# Patient Record
Sex: FEMALE | Race: OTHER | NOT HISPANIC OR LATINO | URBAN - METROPOLITAN AREA
[De-identification: names, ages, dates, MRNs, and addresses within clinical notes are randomized per-mention and may not be internally consistent; named-entity substitution may affect disease eponyms.]

---

## 2023-11-10 PROBLEM — Z00.00 ENCOUNTER FOR PREVENTIVE HEALTH EXAMINATION: Status: ACTIVE | Noted: 2023-11-10

## 2023-11-13 PROBLEM — O36.80X0 PREGNANCY OF UNKNOWN ANATOMIC LOCATION: Status: ACTIVE | Noted: 2023-11-10

## 2023-11-30 PROBLEM — Z32.00 ENCOUNTER FOR CONFIRMATION OF PREGNANCY TEST RESULT WITH PHYSICAL EXAMINATION: Status: ACTIVE | Noted: 2023-11-30

## 2024-01-04 PROBLEM — Z34.91 ENCOUNTER FOR SUPERVISION OF LOW-RISK PREGNANCY IN FIRST TRIMESTER: Status: ACTIVE | Noted: 2024-01-04

## 2024-04-11 PROBLEM — Z34.82 ENCOUNTER FOR SUPERVISION OF NORMAL PREGNANCY IN MULTIGRAVIDA IN SECOND TRIMESTER: Status: ACTIVE | Noted: 2024-02-01

## 2024-07-08 PROBLEM — E03.9 HYPOTHYROID: Status: ACTIVE | Noted: 2024-07-08

## 2024-07-15 ENCOUNTER — INPATIENT (INPATIENT)
Facility: HOSPITAL | Age: 29
LOS: 3 days | Discharge: HOME | End: 2024-07-19
Attending: STUDENT IN AN ORGANIZED HEALTH CARE EDUCATION/TRAINING PROGRAM | Admitting: STUDENT IN AN ORGANIZED HEALTH CARE EDUCATION/TRAINING PROGRAM
Payer: COMMERCIAL

## 2024-07-15 VITALS
SYSTOLIC BLOOD PRESSURE: 115 MMHG | RESPIRATION RATE: 16 BRPM | TEMPERATURE: 99 F | DIASTOLIC BLOOD PRESSURE: 72 MMHG | HEART RATE: 86 BPM

## 2024-07-15 DIAGNOSIS — O26.899 OTHER SPECIFIED PREGNANCY RELATED CONDITIONS, UNSPECIFIED TRIMESTER: ICD-10-CM

## 2024-07-15 DIAGNOSIS — Z98.891 HISTORY OF UTERINE SCAR FROM PREVIOUS SURGERY: Chronic | ICD-10-CM

## 2024-07-15 LAB — AMNISURE ROM (RUPTURE OF MEMBRANES): POSITIVE

## 2024-07-15 RX ORDER — OXYTOCIN 30 [USP'U]/500ML
333.33 INJECTION, SOLUTION INTRAVENOUS
Qty: 20 | Refills: 0 | Status: COMPLETED | OUTPATIENT
Start: 2024-07-15 | End: 2024-07-16

## 2024-07-15 RX ORDER — DEXTROSE MONOHYDRATE AND SODIUM CHLORIDE 5; .3 G/100ML; G/100ML
1000 INJECTION, SOLUTION INTRAVENOUS
Refills: 0 | Status: DISCONTINUED | OUTPATIENT
Start: 2024-07-15 | End: 2024-07-17

## 2024-07-15 RX ORDER — OXYTOCIN 30 [USP'U]/500ML
INJECTION, SOLUTION INTRAVENOUS
Qty: 30 | Refills: 0 | Status: DISCONTINUED | OUTPATIENT
Start: 2024-07-15 | End: 2024-07-16

## 2024-07-15 NOTE — OB PROVIDER H&P - NSHPPHYSICALEXAM_GEN_ALL_CORE
T(C): 37.1 (07-15-24 @ 22:10), Max: 37.1 (07-15-24 @ 20:17)  HR: 92 (07-15-24 @ 22:13) (86 - 92)  BP: 121/70 (07-15-24 @ 22:13) (115/72 - 121/70)  RR: 16 (07-15-24 @ 22:10) (16 - 16)    Heart: RRR  Lungs: CTA  Abdomen: Gravid, soft, NT    NST: Reactive with moderate variability  [place]: Irregular contractions  SSE: no pooling, nitrazine neg, fern neg, Amnisure positive  VE: 2/50/-3  TAS: Cephalic presentation

## 2024-07-15 NOTE — OB PROVIDER H&P - ASSESSMENT
28y  at 39w2d with PROM  Pt with h/o prior C/s for TOLAC  - H/o hypothyroidism  GBS negative  D/w Dr Gant  -Admit to labor and delivery for induction of labor with Cervical balloon and pitocin  -Pain Management prn  -Cont EFM/[place]  -Admission labs: CBC, RPR, T&S  -Type & Crossmatch x 2 units  -IV hydration  -Clear liquid diet  -CRB and Pitocin and for IOL

## 2024-07-15 NOTE — OB PROVIDER H&P - NSLOWPPHRISK_OBGYN_A_OB
Du Pregnancy/Less than or equal to 4 previous vaginal births/No known bleeding disorder/No history of postpartum hemorrhage/No other PPH risks indicated

## 2024-07-15 NOTE — OB PROVIDER H&P - PROBLEM SELECTOR PLAN 1
D/w Dr Gant  -Admit to labor and delivery for induction of labor with Cervical balloon and pitocin  -Pain Management prn  -Cont EFM/[place]  -Admission labs: CBC, RPR, T&S  -Type & Crossmatch x 2 units  -IV hydration  -Clear liquid diet  -CRB and Pitocin and for IOL

## 2024-07-15 NOTE — OB PROVIDER H&P - HISTORY OF PRESENT ILLNESS
28y  at 39w2d presents to triage c/o LOF since 10am this morning, intermittently. Pt reports feeling mild contractions  Reports +FM, no vaginal bleeding.  Prenatal care: Dr Merino  GBS: Negative 24  Prenatal complications:  - h/o prior C/section, desires TOLAC  - Hypothyroidism on synthroid 100mcg

## 2024-07-16 DIAGNOSIS — O42.90 PREMATURE RUPTURE OF MEMBRANES, UNSPECIFIED AS TO LENGTH OF TIME BETWEEN RUPTURE AND ONSET OF LABOR, UNSPECIFIED WEEKS OF GESTATION: ICD-10-CM

## 2024-07-16 LAB
BASOPHILS # BLD AUTO: 0.03 K/UL — SIGNIFICANT CHANGE UP (ref 0–0.2)
BASOPHILS NFR BLD AUTO: 0.4 % — SIGNIFICANT CHANGE UP (ref 0–2)
BLD GP AB SCN SERPL QL: NEGATIVE — SIGNIFICANT CHANGE UP
EOSINOPHIL # BLD AUTO: 0.12 K/UL — SIGNIFICANT CHANGE UP (ref 0–0.5)
EOSINOPHIL NFR BLD AUTO: 1.7 % — SIGNIFICANT CHANGE UP (ref 0–6)
HCT VFR BLD CALC: 31.9 % — LOW (ref 34.5–45)
HGB BLD-MCNC: 11.2 G/DL — LOW (ref 11.5–15.5)
IANC: 4.63 K/UL — SIGNIFICANT CHANGE UP (ref 1.8–7.4)
IMM GRANULOCYTES NFR BLD AUTO: 0.4 % — SIGNIFICANT CHANGE UP (ref 0–0.9)
LYMPHOCYTES # BLD AUTO: 1.78 K/UL — SIGNIFICANT CHANGE UP (ref 1–3.3)
LYMPHOCYTES # BLD AUTO: 25.1 % — SIGNIFICANT CHANGE UP (ref 13–44)
MCHC RBC-ENTMCNC: 29.9 PG — SIGNIFICANT CHANGE UP (ref 27–34)
MCHC RBC-ENTMCNC: 35.1 GM/DL — SIGNIFICANT CHANGE UP (ref 32–36)
MCV RBC AUTO: 85.1 FL — SIGNIFICANT CHANGE UP (ref 80–100)
MONOCYTES # BLD AUTO: 0.5 K/UL — SIGNIFICANT CHANGE UP (ref 0–0.9)
MONOCYTES NFR BLD AUTO: 7.1 % — SIGNIFICANT CHANGE UP (ref 2–14)
NEUTROPHILS # BLD AUTO: 4.63 K/UL — SIGNIFICANT CHANGE UP (ref 1.8–7.4)
NEUTROPHILS NFR BLD AUTO: 65.3 % — SIGNIFICANT CHANGE UP (ref 43–77)
NRBC # BLD: 0 /100 WBCS — SIGNIFICANT CHANGE UP (ref 0–0)
NRBC # FLD: 0 K/UL — SIGNIFICANT CHANGE UP (ref 0–0)
PLATELET # BLD AUTO: 234 K/UL — SIGNIFICANT CHANGE UP (ref 150–400)
RBC # BLD: 3.75 M/UL — LOW (ref 3.8–5.2)
RBC # FLD: 13.8 % — SIGNIFICANT CHANGE UP (ref 10.3–14.5)
RH IG SCN BLD-IMP: POSITIVE — SIGNIFICANT CHANGE UP
RH IG SCN BLD-IMP: POSITIVE — SIGNIFICANT CHANGE UP
T PALLIDUM AB TITR SER: NEGATIVE — SIGNIFICANT CHANGE UP
WBC # BLD: 7.09 K/UL — SIGNIFICANT CHANGE UP (ref 3.8–10.5)
WBC # FLD AUTO: 7.09 K/UL — SIGNIFICANT CHANGE UP (ref 3.8–10.5)

## 2024-07-16 PROCEDURE — 59515 CESAREAN DELIVERY: CPT

## 2024-07-16 PROCEDURE — 49000 EXPLORATION OF ABDOMEN: CPT

## 2024-07-16 DEVICE — SURGICEL SNOW 2 X 4": Type: IMPLANTABLE DEVICE | Status: FUNCTIONAL

## 2024-07-16 DEVICE — INTERCEED 3 X 4": Type: IMPLANTABLE DEVICE | Status: FUNCTIONAL

## 2024-07-16 DEVICE — SURGICEL NU-KNIT 6 X 9": Type: IMPLANTABLE DEVICE | Status: FUNCTIONAL

## 2024-07-16 RX ORDER — HEPARIN SODIUM 50 [USP'U]/ML
5000 INJECTION, SOLUTION INTRAVENOUS EVERY 12 HOURS
Refills: 0 | Status: DISCONTINUED | OUTPATIENT
Start: 2024-07-16 | End: 2024-07-19

## 2024-07-16 RX ORDER — OXYCODONE HYDROCHLORIDE 100 MG/5ML
5 SOLUTION ORAL ONCE
Refills: 0 | Status: DISCONTINUED | OUTPATIENT
Start: 2024-07-16 | End: 2024-07-19

## 2024-07-16 RX ORDER — LEVOTHYROXINE SODIUM 25 MCG
100 TABLET ORAL
Refills: 0 | Status: DISCONTINUED | OUTPATIENT
Start: 2024-07-16 | End: 2024-07-19

## 2024-07-16 RX ORDER — KETOROLAC TROMETHAMINE 30 MG/ML
30 INJECTION, SOLUTION INTRAMUSCULAR EVERY 6 HOURS
Refills: 0 | Status: DISCONTINUED | OUTPATIENT
Start: 2024-07-16 | End: 2024-07-17

## 2024-07-16 RX ORDER — SIMETHICONE 40MG/0.6ML
80 SUSPENSION, DROPS(FINAL DOSAGE FORM)(ML) ORAL EVERY 4 HOURS
Refills: 0 | Status: DISCONTINUED | OUTPATIENT
Start: 2024-07-16 | End: 2024-07-19

## 2024-07-16 RX ORDER — TETANUS TOXOID, REDUCED DIPHTHERIA TOXOID AND ACELLULAR PERTUSSIS VACCINE, ADSORBED 5; 2.5; 8; 8; 2.5 [IU]/.5ML; [IU]/.5ML; UG/.5ML; UG/.5ML; UG/.5ML
0.5 SUSPENSION INTRAMUSCULAR ONCE
Refills: 0 | Status: DISCONTINUED | OUTPATIENT
Start: 2024-07-16 | End: 2024-07-19

## 2024-07-16 RX ORDER — DIPHENHYDRAMINE HCL 12.5MG/5ML
25 ELIXIR ORAL EVERY 6 HOURS
Refills: 0 | Status: DISCONTINUED | OUTPATIENT
Start: 2024-07-16 | End: 2024-07-19

## 2024-07-16 RX ORDER — DEXTROSE MONOHYDRATE AND SODIUM CHLORIDE 5; .3 G/100ML; G/100ML
1000 INJECTION, SOLUTION INTRAVENOUS
Refills: 0 | Status: DISCONTINUED | OUTPATIENT
Start: 2024-07-16 | End: 2024-07-19

## 2024-07-16 RX ORDER — DEXAMETHASONE 1 MG/1
4 TABLET ORAL EVERY 6 HOURS
Refills: 0 | Status: DISCONTINUED | OUTPATIENT
Start: 2024-07-16 | End: 2024-07-17

## 2024-07-16 RX ORDER — ACETAMINOPHEN 325 MG
975 TABLET ORAL
Refills: 0 | Status: DISCONTINUED | OUTPATIENT
Start: 2024-07-16 | End: 2024-07-19

## 2024-07-16 RX ORDER — LEVOTHYROXINE SODIUM 25 MCG
50 TABLET ORAL
Refills: 0 | Status: DISCONTINUED | OUTPATIENT
Start: 2024-07-16 | End: 2024-07-19

## 2024-07-16 RX ORDER — OXYTOCIN 30 [USP'U]/500ML
1 INJECTION, SOLUTION INTRAVENOUS
Qty: 30 | Refills: 0 | Status: DISCONTINUED | OUTPATIENT
Start: 2024-07-16 | End: 2024-07-17

## 2024-07-16 RX ORDER — ONDANSETRON HYDROCHLORIDE 2 MG/ML
4 INJECTION INTRAMUSCULAR; INTRAVENOUS EVERY 6 HOURS
Refills: 0 | Status: DISCONTINUED | OUTPATIENT
Start: 2024-07-16 | End: 2024-07-17

## 2024-07-16 RX ORDER — OXYTOCIN 30 [USP'U]/500ML
333.33 INJECTION, SOLUTION INTRAVENOUS
Qty: 20 | Refills: 0 | Status: DISCONTINUED | OUTPATIENT
Start: 2024-07-16 | End: 2024-07-17

## 2024-07-16 RX ORDER — OXYCODONE HYDROCHLORIDE 100 MG/5ML
5 SOLUTION ORAL
Refills: 0 | Status: COMPLETED | OUTPATIENT
Start: 2024-07-16 | End: 2024-07-23

## 2024-07-16 RX ORDER — LANOLIN
1 WAX (GRAM) MISCELLANEOUS EVERY 6 HOURS
Refills: 0 | Status: DISCONTINUED | OUTPATIENT
Start: 2024-07-16 | End: 2024-07-19

## 2024-07-16 RX ORDER — DEXTROSE MONOHYDRATE AND SODIUM CHLORIDE 5; .3 G/100ML; G/100ML
500 INJECTION, SOLUTION INTRAVENOUS ONCE
Refills: 0 | Status: COMPLETED | OUTPATIENT
Start: 2024-07-16 | End: 2024-07-16

## 2024-07-16 RX ORDER — NALBUPHINE HCL 100 %
2.5 POWDER (GRAM) MISCELLANEOUS EVERY 6 HOURS
Refills: 0 | Status: DISCONTINUED | OUTPATIENT
Start: 2024-07-16 | End: 2024-07-17

## 2024-07-16 RX ORDER — NALOXONE HYDROCHLORIDE 1 MG/ML
0.1 INJECTION PARENTERAL
Refills: 0 | Status: DISCONTINUED | OUTPATIENT
Start: 2024-07-16 | End: 2024-07-17

## 2024-07-16 RX ORDER — OXYCODONE HYDROCHLORIDE 100 MG/5ML
5 SOLUTION ORAL
Refills: 0 | Status: DISCONTINUED | OUTPATIENT
Start: 2024-07-16 | End: 2024-07-16

## 2024-07-16 RX ORDER — MORPHINE SULFATE 100 MG/1
2 TABLET, EXTENDED RELEASE ORAL ONCE
Refills: 0 | Status: DISCONTINUED | OUTPATIENT
Start: 2024-07-16 | End: 2024-07-17

## 2024-07-16 RX ADMIN — KETOROLAC TROMETHAMINE 30 MILLIGRAM(S): 30 INJECTION, SOLUTION INTRAMUSCULAR at 23:00

## 2024-07-16 RX ADMIN — Medication 50 MICROGRAM(S): at 07:00

## 2024-07-16 RX ADMIN — KETOROLAC TROMETHAMINE 30 MILLIGRAM(S): 30 INJECTION, SOLUTION INTRAMUSCULAR at 22:06

## 2024-07-16 RX ADMIN — OXYTOCIN 2 MILLIUNIT(S)/MIN: 30 INJECTION, SOLUTION INTRAVENOUS at 01:56

## 2024-07-16 RX ADMIN — DEXTROSE MONOHYDRATE AND SODIUM CHLORIDE 1000 MILLILITER(S): 5; .3 INJECTION, SOLUTION INTRAVENOUS at 02:15

## 2024-07-16 RX ADMIN — DEXTROSE MONOHYDRATE AND SODIUM CHLORIDE 500 MILLILITER(S): 5; .3 INJECTION, SOLUTION INTRAVENOUS at 06:00

## 2024-07-16 RX ADMIN — Medication 1 APPLICATION(S): at 03:36

## 2024-07-16 RX ADMIN — DEXTROSE MONOHYDRATE AND SODIUM CHLORIDE 125 MILLILITER(S): 5; .3 INJECTION, SOLUTION INTRAVENOUS at 00:57

## 2024-07-16 RX ADMIN — OXYTOCIN 1 MILLIUNIT(S)/MIN: 30 INJECTION, SOLUTION INTRAVENOUS at 06:49

## 2024-07-16 RX ADMIN — OXYTOCIN 1000 MILLIUNIT(S)/MIN: 30 INJECTION, SOLUTION INTRAVENOUS at 22:06

## 2024-07-16 NOTE — OB PROVIDER LABOR PROGRESS NOTE - NS_SUBJECTIVE/OBJECTIVE_OBGYN_ALL_OB_FT
Pt seen and examined for prolonged deceleration
Pt seen and examined at bedside for insertion of Cervical Balloon.

## 2024-07-16 NOTE — CHART NOTE - NSCHARTNOTEFT_GEN_A_CORE
Called for intraop consult to evaluate bladder. Bladder filled and dissected from peritoneum, mobilized away from uterus. No extravasation of methylene blue observed. Osborn upsized to 20 Fr with hematuria.     Continue osborn 24 hrs   Continue to monitor urine color   urology will follow
Patient uncomfortable  FHT category 1  VE 6/70/-3  AROM- forewaters clear
Patient felt some pressure  FHT category 1  VE 7/80/-1  cont present management
Patient had 2 minute decel in LDR 3. ON VE- cervix unchanged but had sign vaginal bleeding and felt station went from -1 to _3. Called for urgent c/section. FHR was back up went took her back as well as in OR, so did c/section under spinal

## 2024-07-16 NOTE — OB RN DELIVERY SUMMARY - NS_SEPSISRSKCALC_OBGYN_ALL_OB_FT
EOS calculated successfully. EOS Risk Factor: 0.5/1000 live births ([facility] national incidence); GA=39w2d; Temp=98.8; ROM=30.25; GBS='Negative'; Antibiotics='No antibiotics or any antibiotics < 2 hrs prior to birth'

## 2024-07-16 NOTE — OB RN DELIVERY SUMMARY - NSSELHIDDEN_OBGYN_ALL_OB_FT
[NS_DeliveryAttending1_OBGYN_ALL_OB_FT:[id number]],[NS_DeliveryRN_OBGYN_ALL_OB_FT:[id number]=]

## 2024-07-16 NOTE — OB RN INTRAOPERATIVE NOTE - NSSELHIDDEN_OBGYN_ALL_OB_FT
[NS_DeliveryAttending1_OBGYN_ALL_OB_FT:[id number]],[NS_DeliveryRN_OBGYN_ALL_OB_FT:[id number]] [NS_DeliveryAttending1_OBGYN_ALL_OB_FT:[id number]],[NS_DeliveryRN_OBGYN_ALL_OB_FT:[id number]],[NS_CirculateRN2_OBGYN_ALL_OB_FT:[id number]]

## 2024-07-16 NOTE — OB PROVIDER TRIAGE NOTE - NSHPPHYSICALEXAM_GEN_ALL_CORE
T(C): 37.1 (07-15-24 @ 22:10), Max: 37.1 (07-15-24 @ 20:17)  HR: 92 (07-15-24 @ 22:13) (86 - 92)  BP: 121/70 (07-15-24 @ 22:13) (115/72 - 121/70)  RR: 16 (07-15-24 @ 22:10) (16 - 16)    Heart: RRR  Lungs: CTA  Abdomen: Gravid, soft, NT    NST: Reactive with moderate variability  [place]: Irregular contractions  SSE: no pooling, nitrazine neg, fern neg, Amnisure positive  VE: 2/50/-3  TAS: Cephalic presentation

## 2024-07-16 NOTE — PROGRESS NOTE ADULT - SUBJECTIVE AND OBJECTIVE BOX
Note    Post op Check    s/p : Intraop consult bladder filled and dissected from peritoneum. Wilkinson upsized to 20 Fr.    Pt seen / examined without complaints pain controlled    Vital Signs Last 24 Hrs  T(C): 36.9 (16 Jul 2024 15:00), Max: 37.0 (16 Jul 2024 10:57)  T(F): 98.42 (16 Jul 2024 15:00), Max: 98.6 (16 Jul 2024 10:57)  HR: 85 (16 Jul 2024 21:45) (73 - 105)  BP: 106/65 (16 Jul 2024 21:45) (91/53 - 147/90)  BP(mean): 74 (16 Jul 2024 21:45) (66 - 78)  RR: 17 (16 Jul 2024 21:45) (12 - 18)  SpO2: 95% (16 Jul 2024 21:45) (93% - 98%)    Parameters below as of 16 Jul 2024 00:58  Patient On (Oxygen Delivery Method): room air    I&O's Summary    15 Jul 2024 07:01  -  16 Jul 2024 07:00  --------------------------------------------------------  IN: 1375 mL / OUT: 0 mL / NET: 1375 mL    16 Jul 2024 07:01  -  16 Jul 2024 22:20  --------------------------------------------------------  IN: 5475 mL / OUT: 2837 mL / NET: 2638 mL    Vital signs reviewed.   CONSTITUTIONAL: Well-appearing; well-nourished; in no apparent distress. Non-toxic appearing.   HEAD: Normocephalic, atraumatic.  EYES: Normalconjunctiva and no sclera injection noted  ENT: Normal nose; no rhinorrhea.  CARD: Normal S1, S2  RESP: Normal chest excursion with respiration; breath sounds clear and equal bilaterally.  ABD/GI: Surgical sites CDI. Soft, non-distended; non-tender.  EXT/MS: Moves all extremities; distal pulses are normal, no pedal edema.  SKIN: Normal for age and race; warm; dry; good turgor; no apparent lesions or exudate noted.  NEURO: Awake, alert, oriented x 3.  PSYCH: Normal mood; appropriate affect.    Wilkinson catheter 200 CC noah colored urine                            11.2   7.09  )-----------( 234      ( 16 Jul 2024 00:44 )             31.9   Note    Post op Check    s/p : Intraop consult bladder filled and dissected from peritoneum. Wilkinson upsized to 22 Fr.    Pt seen / examined without complaints pain controlled    Vital Signs Last 24 Hrs  T(C): 36.9 (16 Jul 2024 15:00), Max: 37.0 (16 Jul 2024 10:57)  T(F): 98.42 (16 Jul 2024 15:00), Max: 98.6 (16 Jul 2024 10:57)  HR: 85 (16 Jul 2024 21:45) (73 - 105)  BP: 106/65 (16 Jul 2024 21:45) (91/53 - 147/90)  BP(mean): 74 (16 Jul 2024 21:45) (66 - 78)  RR: 17 (16 Jul 2024 21:45) (12 - 18)  SpO2: 95% (16 Jul 2024 21:45) (93% - 98%)    Parameters below as of 16 Jul 2024 00:58  Patient On (Oxygen Delivery Method): room air    I&O's Summary    15 Jul 2024 07:01  -  16 Jul 2024 07:00  --------------------------------------------------------  IN: 1375 mL / OUT: 0 mL / NET: 1375 mL    16 Jul 2024 07:01  -  16 Jul 2024 22:20  --------------------------------------------------------  IN: 5475 mL / OUT: 2837 mL / NET: 2638 mL    Vital signs reviewed.   CONSTITUTIONAL: Well-appearing; well-nourished; in no apparent distress. Non-toxic appearing.   HEAD: Normocephalic, atraumatic.  EYES: Normalconjunctiva and no sclera injection noted  ENT: Normal nose; no rhinorrhea.  CARD: Normal S1, S2  RESP: Normal chest excursion with respiration; breath sounds clear and equal bilaterally.  ABD/GI: Surgical sites CDI. Soft, non-distended; non-tender.  EXT/MS: Moves all extremities; distal pulses are normal, no pedal edema.  SKIN: Normal for age and race; warm; dry; good turgor; no apparent lesions or exudate noted.  NEURO: Awake, alert, oriented x 3.  PSYCH: Normal mood; appropriate affect.    Wilkinson catheter 200 CC noah colored urine                            11.2   7.09  )-----------( 234      ( 16 Jul 2024 00:44 )             31.9

## 2024-07-16 NOTE — PROGRESS NOTE ADULT - ASSESSMENT
PT is s/p Intraop consult bladder filled and dissected from peritoneum. Wilkinson upsized to 20 Fr. No acute events during PACU course.     Strict I&O's  Analgesia/Antiemetics  DVT prophylaxis  Incentive spirometry  Clears / OOB     PT is s/p Intraop consult bladder filled and dissected from peritoneum. Wilkinson upsized to 22 Fr. No acute events during PACU course.     Strict I&O's  Analgesia/Antiemetics  DVT prophylaxis  Incentive spirometry  Clears / OOB

## 2024-07-16 NOTE — OB PROVIDER DELIVERY SUMMARY - NSSELHIDDEN_OBGYN_ALL_OB_FT
[NS_DeliveryAttending1_OBGYN_ALL_OB_FT:[id number]],[NS_DeliveryRN_OBGYN_ALL_OB_FT:[id number]],[NS_DeliveryAssist1_OBGYN_ALL_OB_FT:[id number]=]

## 2024-07-16 NOTE — OB PROVIDER LABOR PROGRESS NOTE - ASSESSMENT
Patient seen and examined secondary to*****. *****Effective epidural in situ.    T(C): 37.1 (07-15-24 @ 22:10), Max: 37.1 (07-15-24 @ 20:17)  HR: 88 (24 @ 00:58) (86 - 92)  BP: 120/65 (24 @ 00:58) (115/72 - 121/70)  RR: 17 (24 @ 00:58) (16 - 17)  SpO2: --    28y  @01bhq5p here for TOLAC, IOL for PROM this AM.    -s/p cervical balloon at 1:30am  -3/50/-3  -pt with PROM  -For pitocin when able  -Cont fetal/maternal monitoring  -Will reassess PRN    Will discuss with Dr. Stalin Pryor, PGY-1

## 2024-07-16 NOTE — OB PROVIDER DELIVERY SUMMARY - NSPROVIDERDELIVERYNOTE_OBGYN_ALL_OB_FT
emergent rLTCS@39w2d for concern for uterine rupture in setting of new onset bright red vaginal bleeding, fetal heart deceleration and small loss of station since prior exam.     On entry into peritoneal cavity, no uterine rupture noted. Bladder noted to be higher on the lower uterine segment.  Low transverse incision made.  Viable female infant,  vertex OP presentation.     Upon inspection of the hysterotomy, bladder noted to be  in close proximity to the inferior portion of the hysterotomy. Bladder taken down sharply. Upon taking down the bladder, large 7cm x 6cm uterine defect noted posterior to the bladder. Hematuria noted at this time.   Hysterotomy closed in a single layer with 1-0 caprosyn.      Urology consulted intraoperatively for bladder dissection and mobilization, please see separate brief op not for details. Bladder backfilled with methylene blue, noted to be intact with no extravasation of methylene blue.  After dissection of peritoneum off the bladder by Urology, Surgicell placed between peritoneum fold and bladder.  No indication for cystoscopy per Urology team.      Lower uterine segment defect (likely site of previous hysterotomy) was reapproximated with 2-0 caprosyn.  Surgicell powder and Intercede placed along hysterotomy.   Fascia with 1-0 vicryl. Subcutaneous fat closed with 2-0 plain gut. Skin closed with 4-0 monocryl. Good hemostasis noted.  Grossly normal fallopian tubes, ovaries.  Aquacel dressing placed.     QBL:  952  IVF: 4000  UOP: 700      CBC/BMP in 4 hours  CBC/BMP in AM  20 Spanish osborn to remain until hematuria resolving, Urology following.     Dictation#

## 2024-07-16 NOTE — OB RN PATIENT PROFILE - FALL HARM RISK - UNIVERSAL INTERVENTIONS
Bed in lowest position, wheels locked, appropriate side rails in place/Call bell, personal items and telephone in reach/Instruct patient to call for assistance before getting out of bed or chair/Non-slip footwear when patient is out of bed/[place] to call system/Physically safe environment - no spills, clutter or unnecessary equipment/Purposeful Proactive Rounding/Room/bathroom lighting operational, light cord in reach

## 2024-07-16 NOTE — OB PROVIDER LABOR PROGRESS NOTE - ASSESSMENT
28y  @39w2d admitted for IOL.    - Maternal repositioning and tracing resuscitated  - 500cc bolus administered  - Continue cont EFM, toco, IVF    Dr. Medina in room during evaluation  Jennifer Aldana PGY2

## 2024-07-16 NOTE — OB NEONATOLOGY/PEDIATRICIAN DELIVERY SUMMARY - NSPEDSNEONOTESA_OBGYN_ALL_OB_FT
NICU resuscitation team called to OR for baby girl born at 39.1 wks via unscheduled CS due to concern for uterine rupture to a 27 y/o  A+ blood type mother. Maternal history of hypothyroidism, on levothyroxine 50mcg Tue/Thur/Sat/Sun and 100mcg on Mon/Wed/Fri. Prenatal history of one prior CS for failure of progression of labor. PNL: HIV NR, Hep B neg, Rubella immune, RPR neg, Hep C NR. GBS - on . SROM at 1000 on 7/15 with clear fluids. (ROM: ~ 28 hrs) Delivery complicated by concern for uterine rupture.   Delayed cord clamping x60 sec. Baby emerged vigorous, crying, was w/d/s/s with APGARS of 8/9. Mom would like to breastfeed, and consents to the Hep B vaccine. Highest maternal temp. was 37.0 C, EOS 0.21    Physical Exam:  Gen: NAD, +grimace  HEENT: anterior fontanel open soft and flat, no cleft lip/palate, ears normal set, no ear pits or tags. no lesions in mouth/throat, nares clinically patent  Resp: no increased work of breathing, good air entry b/l, clear to auscultation bilaterally  Cardio: Normal S1/S2, regular rate and rhythm, no murmurs, rubs or gallops  Abd: soft, non tender, non distended, + bowel sounds, umbilical cord with 3 vessels  Neuro: +grasp/suck/fabian, normal tone  Extremities: negative yo and ortolani, moving all extremities, full range of motion x 4, no crepitus  Skin: pink, warm  Genitals: Normal female anatomy, Reji 1, anus patent NICU resuscitation team called to OR for baby girl born at 39.1 wks via unscheduled CS due to suspected uterine rupture to a 27 y/o  A+ blood type mother. Maternal history of hypothyroidism, on levothyroxine 50mcg Tue/Thur/Sat/Sun and 100mcg on Mon/Wed/Fri. Prenatal history of one prior CS for failure of progression of labor. PNL: HIV NR, Hep B neg, Rubella immune, RPR neg, Hep C NR. GBS negative on . SROM at 1000 on 7/15 with clear fluids. (ROM: ~ 28 hrs) Delivery complicated by suspected uterine rupture.   Baby emerged vigorous, crying. Delayed cord clamping x 60 seconds. WDSS APGAR 8/9. Mother would like to breastfeed, and consents to the HBV vaccine. Highest maternal temp. was 37.0 C, EOS 0.21    Physical Exam:  Gen: NAD, +grimace  HEENT: anterior fontanel open soft and flat, no cleft lip/palate, ears normal set, no ear pits or tags.  Resp: no increased work of breathing, good air entry b/l, clear to auscultation bilaterally  Cardio: Normal S1/S2, regular rate and rhythm, no murmurs, rubs or gallops  Abd: soft, non tender, non distended, + bowel sounds, umbilical cord with 3 vessels  Neuro: +grasp/suck/M[place], normal tone  Extremities: negative Fregoso and Ortolani, moving all extremities, full range of motion x 4, no crepitus  Skin: pink, warm  Genitals: Normal female anatomy, Reji 1, anus patent

## 2024-07-16 NOTE — OB RN PATIENT PROFILE - VDRL/RPR: DATE, OB PROFILE
Instructed patient/caregiver regarding signs and symptoms of infection./Verbal/written post procedure instructions were given to patient/caregiver./Instructed patient/caregiver to follow-up with primary care physician. 16-Jul-2024

## 2024-07-17 LAB
ALBUMIN SERPL ELPH-MCNC: 2.7 G/DL — LOW (ref 3.3–5)
ALP SERPL-CCNC: 88 U/L — SIGNIFICANT CHANGE UP (ref 40–120)
ALT FLD-CCNC: 8 U/L — SIGNIFICANT CHANGE UP (ref 4–33)
ANION GAP SERPL CALC-SCNC: 14 MMOL/L — SIGNIFICANT CHANGE UP (ref 7–14)
AST SERPL-CCNC: 26 U/L — SIGNIFICANT CHANGE UP (ref 4–32)
BASOPHILS # BLD AUTO: 0.03 K/UL — SIGNIFICANT CHANGE UP (ref 0–0.2)
BASOPHILS NFR BLD AUTO: 0.4 % — SIGNIFICANT CHANGE UP (ref 0–2)
BILIRUB SERPL-MCNC: 0.3 MG/DL — SIGNIFICANT CHANGE UP (ref 0.2–1.2)
BUN SERPL-MCNC: 5 MG/DL — LOW (ref 7–23)
CALCIUM SERPL-MCNC: 8.6 MG/DL — SIGNIFICANT CHANGE UP (ref 8.4–10.5)
CHLORIDE SERPL-SCNC: 103 MMOL/L — SIGNIFICANT CHANGE UP (ref 98–107)
CO2 SERPL-SCNC: 20 MMOL/L — LOW (ref 22–31)
CREAT SERPL-MCNC: 0.46 MG/DL — LOW (ref 0.5–1.3)
EGFR: 132 ML/MIN/1.73M2 — SIGNIFICANT CHANGE UP
EOSINOPHIL # BLD AUTO: 0.02 K/UL — SIGNIFICANT CHANGE UP (ref 0–0.5)
EOSINOPHIL NFR BLD AUTO: 0.2 % — SIGNIFICANT CHANGE UP (ref 0–6)
GLUCOSE SERPL-MCNC: 151 MG/DL — HIGH (ref 70–99)
HCT VFR BLD CALC: 25.4 % — LOW (ref 34.5–45)
HCT VFR BLD CALC: 27.6 % — LOW (ref 34.5–45)
HGB BLD-MCNC: 8.8 G/DL — LOW (ref 11.5–15.5)
HGB BLD-MCNC: 9 G/DL — LOW (ref 11.5–15.5)
IANC: 6.51 K/UL — SIGNIFICANT CHANGE UP (ref 1.8–7.4)
IMM GRANULOCYTES NFR BLD AUTO: 0.4 % — SIGNIFICANT CHANGE UP (ref 0–0.9)
LYMPHOCYTES # BLD AUTO: 1.35 K/UL — SIGNIFICANT CHANGE UP (ref 1–3.3)
LYMPHOCYTES # BLD AUTO: 15.8 % — SIGNIFICANT CHANGE UP (ref 13–44)
MCHC RBC-ENTMCNC: 28.4 PG — SIGNIFICANT CHANGE UP (ref 27–34)
MCHC RBC-ENTMCNC: 29.8 PG — SIGNIFICANT CHANGE UP (ref 27–34)
MCHC RBC-ENTMCNC: 32.6 GM/DL — SIGNIFICANT CHANGE UP (ref 32–36)
MCHC RBC-ENTMCNC: 34.6 GM/DL — SIGNIFICANT CHANGE UP (ref 32–36)
MCV RBC AUTO: 86.1 FL — SIGNIFICANT CHANGE UP (ref 80–100)
MCV RBC AUTO: 87.1 FL — SIGNIFICANT CHANGE UP (ref 80–100)
MONOCYTES # BLD AUTO: 0.6 K/UL — SIGNIFICANT CHANGE UP (ref 0–0.9)
MONOCYTES NFR BLD AUTO: 7 % — SIGNIFICANT CHANGE UP (ref 2–14)
NEUTROPHILS # BLD AUTO: 6.51 K/UL — SIGNIFICANT CHANGE UP (ref 1.8–7.4)
NEUTROPHILS NFR BLD AUTO: 76.2 % — SIGNIFICANT CHANGE UP (ref 43–77)
NRBC # BLD: 0 /100 WBCS — SIGNIFICANT CHANGE UP (ref 0–0)
NRBC # BLD: 0 /100 WBCS — SIGNIFICANT CHANGE UP (ref 0–0)
NRBC # FLD: 0 K/UL — SIGNIFICANT CHANGE UP (ref 0–0)
NRBC # FLD: 0 K/UL — SIGNIFICANT CHANGE UP (ref 0–0)
PLATELET # BLD AUTO: 197 K/UL — SIGNIFICANT CHANGE UP (ref 150–400)
PLATELET # BLD AUTO: 198 K/UL — SIGNIFICANT CHANGE UP (ref 150–400)
POTASSIUM SERPL-MCNC: 4.1 MMOL/L — SIGNIFICANT CHANGE UP (ref 3.5–5.3)
POTASSIUM SERPL-SCNC: 4.1 MMOL/L — SIGNIFICANT CHANGE UP (ref 3.5–5.3)
PROT SERPL-MCNC: 5.1 G/DL — LOW (ref 6–8.3)
RBC # BLD: 2.95 M/UL — LOW (ref 3.8–5.2)
RBC # BLD: 3.17 M/UL — LOW (ref 3.8–5.2)
RBC # FLD: 13.6 % — SIGNIFICANT CHANGE UP (ref 10.3–14.5)
RBC # FLD: 13.9 % — SIGNIFICANT CHANGE UP (ref 10.3–14.5)
SODIUM SERPL-SCNC: 137 MMOL/L — SIGNIFICANT CHANGE UP (ref 135–145)
WBC # BLD: 8.54 K/UL — SIGNIFICANT CHANGE UP (ref 3.8–10.5)
WBC # BLD: 9.18 K/UL — SIGNIFICANT CHANGE UP (ref 3.8–10.5)
WBC # FLD AUTO: 8.54 K/UL — SIGNIFICANT CHANGE UP (ref 3.8–10.5)
WBC # FLD AUTO: 9.18 K/UL — SIGNIFICANT CHANGE UP (ref 3.8–10.5)

## 2024-07-17 RX ORDER — LEVOTHYROXINE SODIUM 25 MCG
1 TABLET ORAL
Refills: 0 | DISCHARGE

## 2024-07-17 RX ORDER — SENNOSIDES 8.6 MG
2 TABLET ORAL AT BEDTIME
Refills: 0 | Status: DISCONTINUED | OUTPATIENT
Start: 2024-07-17 | End: 2024-07-19

## 2024-07-17 RX ORDER — PRENATAL VIT/IRON FUM/FOLIC AC 60 MG-1 MG
1 TABLET ORAL DAILY
Refills: 0 | Status: DISCONTINUED | OUTPATIENT
Start: 2024-07-17 | End: 2024-07-19

## 2024-07-17 RX ORDER — PRENATAL VIT/IRON FUM/FOLIC AC 60 MG-1 MG
1 TABLET ORAL
Refills: 0 | DISCHARGE

## 2024-07-17 RX ORDER — ACETAMINOPHEN 325 MG
3 TABLET ORAL
Qty: 0 | Refills: 0 | DISCHARGE
Start: 2024-07-17

## 2024-07-17 RX ORDER — FERROUS SULFATE 325(65) MG
325 TABLET ORAL THREE TIMES A DAY
Refills: 0 | Status: DISCONTINUED | OUTPATIENT
Start: 2024-07-17 | End: 2024-07-19

## 2024-07-17 RX ADMIN — HEPARIN SODIUM 5000 UNIT(S): 50 INJECTION, SOLUTION INTRAVENOUS at 03:06

## 2024-07-17 RX ADMIN — KETOROLAC TROMETHAMINE 30 MILLIGRAM(S): 30 INJECTION, SOLUTION INTRAMUSCULAR at 12:55

## 2024-07-17 RX ADMIN — KETOROLAC TROMETHAMINE 30 MILLIGRAM(S): 30 INJECTION, SOLUTION INTRAMUSCULAR at 05:00

## 2024-07-17 RX ADMIN — Medication 80 MILLIGRAM(S): at 12:25

## 2024-07-17 RX ADMIN — Medication 600 MILLIGRAM(S): at 18:12

## 2024-07-17 RX ADMIN — Medication 975 MILLIGRAM(S): at 03:48

## 2024-07-17 RX ADMIN — Medication 975 MILLIGRAM(S): at 03:06

## 2024-07-17 RX ADMIN — Medication 975 MILLIGRAM(S): at 08:25

## 2024-07-17 RX ADMIN — Medication 600 MILLIGRAM(S): at 23:09

## 2024-07-17 RX ADMIN — Medication 975 MILLIGRAM(S): at 20:19

## 2024-07-17 RX ADMIN — KETOROLAC TROMETHAMINE 30 MILLIGRAM(S): 30 INJECTION, SOLUTION INTRAMUSCULAR at 12:25

## 2024-07-17 RX ADMIN — Medication 975 MILLIGRAM(S): at 14:46

## 2024-07-17 RX ADMIN — Medication 100 MICROGRAM(S): at 08:25

## 2024-07-17 RX ADMIN — KETOROLAC TROMETHAMINE 30 MILLIGRAM(S): 30 INJECTION, SOLUTION INTRAMUSCULAR at 05:30

## 2024-07-17 RX ADMIN — Medication 600 MILLIGRAM(S): at 23:39

## 2024-07-17 RX ADMIN — Medication 30 MILLILITER(S): at 12:25

## 2024-07-17 RX ADMIN — Medication 600 MILLIGRAM(S): at 17:42

## 2024-07-17 RX ADMIN — Medication 975 MILLIGRAM(S): at 20:49

## 2024-07-17 RX ADMIN — HEPARIN SODIUM 5000 UNIT(S): 50 INJECTION, SOLUTION INTRAVENOUS at 15:07

## 2024-07-17 RX ADMIN — Medication 975 MILLIGRAM(S): at 15:16

## 2024-07-17 RX ADMIN — Medication 975 MILLIGRAM(S): at 08:55

## 2024-07-17 NOTE — PROVIDER CONTACT NOTE (OTHER) - ACTION/TREATMENT ORDERED:
As per MERY Rizzo, increase PO hydration and if symptoms persist, bladder scan pt and notify provider

## 2024-07-17 NOTE — DISCHARGE NOTE OB - HOSPITAL COURSE
Patient presented with PROM> Induced with pitocin, History of prior c/section  At 7 cm started having increased bleeding and slight loss of station with fetal heart decel. Decision made to do urgent c/section secondary to concern for ruptured uterus  Viable female infant delivered and found uterine rupture underneath the bladder, requiring urology to mobilize the bladder to properly suture the dehiscence site.

## 2024-07-17 NOTE — DISCHARGE NOTE OB - PATIENT PORTAL LINK FT
[Allergy Testing Cat] : cat [Allergy Testing Dust Mite] : dust mites [Allergy Testing Mixed Feathers] : feathers [Allergy Testing Cockroach] : cockroach [Allergy Testing Dog] : dog [Allergy Testing Trees] : trees [Allergy Testing Weeds] : weeds [Allergy Testing Grasses] : grasses [] : fish [________] : [unfilled] You can access the FollowMyHealth Patient Portal offered by [facility] by registering at the following website: [email or web address]. By joining [facility]’s FollowMyHealth portal, you will also be able to view your health information using other applications (apps) compatible with our system.

## 2024-07-17 NOTE — DISCHARGE NOTE OB - MEDICATION SUMMARY - MEDICATIONS TO TAKE
I will START or STAY ON the medications listed below when I get home from the hospital:    ibuprofen 600 mg oral tablet  -- 1 tab(s) by mouth every 6 hours  -- Indication: For H/O  section    acetaminophen 325 mg oral tablet  -- 3 tab(s) by mouth every 6 hours as needed for  mild pain  -- Indication: For H/O  section   I will START or STAY ON the medications listed below when I get home from the hospital:    ibuprofen 600 mg oral tablet  -- 1 tab(s) by mouth every 6 hours  -- Indication: For H/O  section    acetaminophen 325 mg oral tablet  -- 3 tab(s) by mouth every 6 hours as needed for  mild pain  -- Indication: For H/O  section    ferrous sulfate 325 mg (65 mg elemental iron) oral tablet  -- 1 tab(s) by mouth 3 times a day  -- Indication: For H/O  section    ascorbic acid 500 mg oral tablet  -- 1 tab(s) by mouth once a day  -- Indication: For H/O  section

## 2024-07-17 NOTE — PROGRESS NOTE ADULT - SUBJECTIVE AND OBJECTIVE BOX
OB Progress Note:  Delivery, POD#1    S: 29yo  POD#1 s/p rLTCS+bladder dissection. Her pain is well controlled. She is tolerating a regular diet, not yet passing flatus. Denies N/V. Denies CP/SOB. Endorses light vaginal bleeding, less than one pad per hour. She stood once with some dizziness/lightheadedness. Wilkinson in situ, urine improved from sandrita blood to bloody urine since procedure.     O:   Vital Signs Last 24 Hrs  T(C): 36.9 (2024 06:00), Max: 37.0 (2024 10:57)  T(F): 98.4 (2024 06:00), Max: 98.6 (2024 10:57)  HR: 90 (2024 06:00) (73 - 105)  BP: 100/60 (2024 06:00) (91/53 - 147/90)  BP(mean): 74 (2024 21:45) (66 - 78)  RR: 17 (2024 06:00) (12 - 17)  SpO2: 97% (2024 06:00) (93% - 98%)    Parameters below as of 2024 23:00  Patient On (Oxygen Delivery Method): room air        Labs:  Blood type: A Positive  Rubella IgG: RPR: Negative                          9.0<L>   9.18 >-----------< 197    (  @ 02:00 )             27.6<L>                        11.2<L>   7.09 >-----------< 234    (  @ 00:44 )             31.9<L>    24 @ 00:59      137  |  103  |  5<L>  ----------------------------<  151<H>  4.1   |  20<L>  |  0.46<L>        Ca    8.6      2024 00:59    TPro  5.1<L>  /  Alb  2.7<L>  /  TBili  0.3  /  DBili  x   /  AST  26  /  ALT  8   /  AlkPhos  88  07-17-24 @ 00:59          PE:  General: NAD  Heart: extremities well-perfused  Lungs: breathing comfortably  Abdomen: Appropriately tender, incision c/d/i with Aquacel in place  Extremities: Foot tightness with 1+ pitting edema b/l; denies leg pain; SCDs on and working    A/P: 29yo  POD#1 s/p rLTCS+bladder dissection. Patient is stable and doing well post-operatively.      #rLTCS+bladder dissection  - Continue regular diet.  - Increase ambulation, with assistance if necessary.  - Continue motrin, tylenol, oxycodone PRN for pain control.    - Increase PO hydration.  - Hematocrit: 31.9->27.6   - Cr: 0.46  - F/u AM CBC  - Appreciate urology recs for Wilkinson removal    Jama Reese PGY1 OB Progress Note:  Delivery, POD#1    S: 27yo  POD#1 s/p rLTCS+bladder dissection. Her pain is well controlled. She is tolerating a regular diet, not yet passing flatus. Denies N/V. Denies CP/SOB. Endorses light vaginal bleeding, less than one pad per hour. She stood once with some dizziness/lightheadedness. Wilkinson in situ, urine improved from sandrita blood to bloody urine since procedure.     O:   Vital Signs Last 24 Hrs  T(C): 36.9 (2024 06:00), Max: 37.0 (2024 10:57)  T(F): 98.4 (2024 06:00), Max: 98.6 (2024 10:57)  HR: 90 (2024 06:00) (73 - 105)  BP: 100/60 (2024 06:00) (91/53 - 147/90)  BP(mean): 74 (2024 21:45) (66 - 78)  RR: 17 (2024 06:00) (12 - 17)  SpO2: 97% (2024 06:00) (93% - 98%)    Parameters below as of 2024 23:00  Patient On (Oxygen Delivery Method): room air        Labs:  Blood type: A Positive  Rubella IgG: RPR: Negative                          9.0<L>   9.18 >-----------< 197    (  @ 02:00 )             27.6<L>                        11.2<L>   7.09 >-----------< 234    (  @ 00:44 )             31.9<L>    24 @ 00:59      137  |  103  |  5<L>  ----------------------------<  151<H>  4.1   |  20<L>  |  0.46<L>        Ca    8.6      2024 00:59    TPro  5.1<L>  /  Alb  2.7<L>  /  TBili  0.3  /  DBili  x   /  AST  26  /  ALT  8   /  AlkPhos  88  07-17-24 @ 00:59          PE:  General: NAD  Heart: extremities well-perfused  Lungs: breathing comfortably  Abdomen: Appropriately tender, fundus firm, incision c/d/i with Aquacel in place  Extremities: Foot tightness with 1+ pitting edema b/l; denies leg pain; SCDs on and working     OB Progress Note:  Delivery, POD#1    S: 29yo  POD#1 s/p rLTCS+bladder dissection. Her pain is well controlled. She is tolerating a regular diet, not yet passing flatus. Denies N/V. Denies CP/SOB. Endorses light vaginal bleeding, less than one pad per hour. She stood once with some dizziness/lightheadedness. Wilkinson in situ, urine improved from sandrita blood to bloody urine since procedure.     O:   Vital Signs Last 24 Hrs  T(C): 36.9 (2024 06:00), Max: 37.0 (2024 10:57)  T(F): 98.4 (2024 06:00), Max: 98.6 (2024 10:57)  HR: 90 (2024 06:00) (73 - 105)  BP: 100/60 (2024 06:00) (91/53 - 147/90)  BP(mean): 74 (2024 21:45) (66 - 78)  RR: 17 (2024 06:00) (12 - 17)  SpO2: 97% (2024 06:00) (93% - 98%)    Parameters below as of 2024 23:00  Patient On (Oxygen Delivery Method): room air        Labs:  Blood type: A Positive  Rubella IgG: RPR: Negative                          9.0<L>   9.18 >-----------< 197    (  @ 02:00 )             27.6<L>                        11.2<L>   7.09 >-----------< 234    (  @ 00:44 )             31.9<L>    24 @ 00:59      137  |  103  |  5<L>  ----------------------------<  151<H>  4.1   |  20<L>  |  0.46<L>        Ca    8.6      2024 00:59    TPro  5.1<L>  /  Alb  2.7<L>  /  TBili  0.3  /  DBili  x   /  AST  26  /  ALT  8   /  AlkPhos  88  07-17-24 @ 00:59          PE:  General: NAD  Heart: extremities well-perfused  Lungs: breathing comfortably  Abdomen: Appropriately tender, fundus firm, incision c/d/i with Aquacel in place  Extremities: 1+ pitting edema b/l; SCDs on and working

## 2024-07-17 NOTE — DISCHARGE NOTE OB - CARE PLAN
Principal Discharge DX:	Fetal distress, delivered  Assessment and plan of treatment:	pelvic rest x 6 weeks   1

## 2024-07-17 NOTE — PROGRESS NOTE ADULT - ASSESSMENT
A/P: 27yo  POD#1 s/p rLTCS+bladder dissection. Patient is stable and doing well post-operatively.      #rLTCS+bladder dissection  - Continue regular diet.  - Increase ambulation, with assistance if necessary.  - Continue motrin, tylenol, oxycodone PRN for pain control.    - Increase PO hydration.  - Hematocrit: 31.9->27.6   - Cr: 0.46  - F/u AM CBC  - Appreciate urology recs for Wilkinson removal    Jama Reese PGY1

## 2024-07-17 NOTE — DISCHARGE NOTE OB - CARE PROVIDER_API CALL
Hunter Merino  Obstetrics and Gynecology  [address]  [place] [address]  Phone: [phone]  Fax: [phone]  Follow Up Time:

## 2024-07-17 NOTE — PROVIDER CONTACT NOTE (OTHER) - BACKGROUND
rpt c/s on 7/16 @ 1615. patient had bladder dissection during c/s and required 20 Fr catheter. catheter removed 7/17 @ 1200. patient has voided twice since catheter removal (450 cc and 500 cc) Consent: The patient's consent was obtained including but not limited to risks of crusting, scabbing, blistering, scarring, darker or lighter pigmentary change, recurrence, incomplete removal and infection. Add 52 Modifier (Optional): no Medical Necessity Clause: This procedure was medically necessary because the lesions that were treated were: Duration Of Freeze Thaw-Cycle (Seconds): 2 Show Applicator Variable?: Yes Number Of Freeze-Thaw Cycles: 2 freeze-thaw cycles Post-Care Instructions: I reviewed with the patient in detail post-care instructions. Patient is to wear sunprotection, and avoid picking at any of the treated lesions. Pt may apply Vaseline to crusted or scabbing areas. Medical Necessity Information: It is in your best interest to select a reason for this procedure from the list below. All of these items fulfill various CMS LCD requirements except the new and changing color options. Detail Level: Detailed

## 2024-07-17 NOTE — PROGRESS NOTE ADULT - SUBJECTIVE AND OBJECTIVE BOX
Subjective  Patient seen and examined at bedside. No events overnight. Feels well.     Objective    Vital signs  T(F): , Max: 98.6 (07-16-24 @ 10:57)  HR: 90 (07-17-24 @ 06:00)  BP: 100/60 (07-17-24 @ 06:00)  SpO2: 97% (07-17-24 @ 06:00)  Wt(kg): --    Output     OUT:    Indwelling Catheter - Urethral (mL): 1765 mL  Total OUT: 1765 mL    Total NET: -1765 mL          Gen: NAD  Abd: soft, nontender, nondistended  : osborn secured in place, draining CYU    Labs      07-17 @ 02:00    WBC 9.18  / Hct 27.6  / SCr --       07-17 @ 00:59    WBC --    / Hct --    / SCr 0.46

## 2024-07-17 NOTE — PROGRESS NOTE ADULT - ASSESSMENT
28 year old female s/p , called for intraop consult to evaluate bladder. Bladder filled and dissected from peritoneum, mobilized away from uterus. No extravasation of methylene blue observed. Wilkinson upsized to 20 Fr with hematuria.     Plan:  - Urine clear at bedside, can TOV today  - No further urologic recommendations, is clear for discharge from our standpoint  - No urologic follow up necessary    Discussed with Dr. Betsy [facility]ares [facility]  [address] [place], [place] [id number] [phone]       28 year old female s/p , called for intraop consult to evaluate bladder. Bladder filled and dissected from peritoneum, mobilized away from uterus. No extravasation of methylene blue observed. Wilkinson upsized to 22 Fr with hematuria.     Plan:  - Urine clear at bedside, can TOV today  - No further urologic recommendations, is clear for discharge from our standpoint    Patient seen with Dr. Betsy Solares [facility]  [address] [place], [place] [id number] [phone]

## 2024-07-17 NOTE — PROGRESS NOTE ADULT - SUBJECTIVE AND OBJECTIVE BOX
POST OP DAY  1  s/p   SECTION    SUBJECTIVE:    PAIN SCALE SCORE: [x] Refer to charted pain scores    THERAPY:  [  ] Spinal morphine   [ X ] Epidural morphine   [  ] IV PCA Hydromorphone 1 mg/ml    OBJECTIVE:     SEDATION SCORE:	  [ x ] Alert	    [  ] Drowsy        [  ] Arousable	[  ] Asleep	[  ] Unresponsive    Side Effects:	  [ x ] None	     [  ] Nausea        [  ] Pruritus        [  ] Weakness   [  ] Numbness        ASSESSMENT/ PLAN   [   ] Discontinue         [  ] Continue    [ x ] Change to prn Analgesics as per primary service.    DOCUMENTATION & VERIFICATION OF CURRENT MEDS [ x ] Done    COMMENTS: No Headache.

## 2024-07-17 NOTE — PROVIDER CONTACT NOTE (OTHER) - ASSESSMENT
patient c/o pain and burning while attempting to urinate. patient says she feels urge to urinate but cannot. patient's last void was today at 2100.

## 2024-07-18 PROBLEM — E03.9 HYPOTHYROIDISM, UNSPECIFIED: Chronic | Status: ACTIVE | Noted: 2024-07-15

## 2024-07-18 RX ORDER — OXYCODONE HYDROCHLORIDE 100 MG/5ML
5 SOLUTION ORAL
Refills: 0 | Status: DISCONTINUED | OUTPATIENT
Start: 2024-07-18 | End: 2024-07-19

## 2024-07-18 RX ADMIN — Medication 975 MILLIGRAM(S): at 03:37

## 2024-07-18 RX ADMIN — Medication 80 MILLIGRAM(S): at 17:48

## 2024-07-18 RX ADMIN — Medication 1 TABLET(S): at 15:44

## 2024-07-18 RX ADMIN — Medication 975 MILLIGRAM(S): at 15:43

## 2024-07-18 RX ADMIN — Medication 975 MILLIGRAM(S): at 09:56

## 2024-07-18 RX ADMIN — Medication 975 MILLIGRAM(S): at 20:08

## 2024-07-18 RX ADMIN — Medication 600 MILLIGRAM(S): at 17:48

## 2024-07-18 RX ADMIN — OXYCODONE HYDROCHLORIDE 5 MILLIGRAM(S): 100 SOLUTION ORAL at 12:09

## 2024-07-18 RX ADMIN — HEPARIN SODIUM 5000 UNIT(S): 50 INJECTION, SOLUTION INTRAVENOUS at 03:37

## 2024-07-18 RX ADMIN — Medication 975 MILLIGRAM(S): at 10:26

## 2024-07-18 RX ADMIN — OXYCODONE HYDROCHLORIDE 5 MILLIGRAM(S): 100 SOLUTION ORAL at 12:39

## 2024-07-18 RX ADMIN — Medication 80 MILLIGRAM(S): at 12:09

## 2024-07-18 RX ADMIN — Medication 975 MILLIGRAM(S): at 20:38

## 2024-07-18 RX ADMIN — Medication 975 MILLIGRAM(S): at 04:07

## 2024-07-18 RX ADMIN — Medication 325 MILLIGRAM(S): at 06:19

## 2024-07-18 RX ADMIN — Medication 600 MILLIGRAM(S): at 18:18

## 2024-07-18 RX ADMIN — Medication 975 MILLIGRAM(S): at 16:13

## 2024-07-18 RX ADMIN — HEPARIN SODIUM 5000 UNIT(S): 50 INJECTION, SOLUTION INTRAVENOUS at 15:44

## 2024-07-18 RX ADMIN — Medication 50 MICROGRAM(S): at 06:18

## 2024-07-18 NOTE — PROGRESS NOTE ADULT - SUBJECTIVE AND OBJECTIVE BOX
OB Progress Note: LTCS, POD#2    S: 27yo  POD#2 s/p pLTCS. Pain is well controlled. She is tolerating a regular diet and passing flatus. She is voiding spontaneously, and ambulating without difficulty. Endorses light vaginal bleeding, soaking <1 pad/hr. Denies CP/SOB. Denies lightheadedness/dizziness. Denies N/V.    Pt had pain episode overnight, with sensation of being unable to urinate and endorsing 10/10, burning pain. Pt encouraged to PO hydrate. Episode resolved following pt urinating.    O:  Vitals:  Vital Signs Last 24 Hrs  T(C): 36.3 (2024 06:00), Max: 36.8 (2024 14:15)  T(F): 97.4 (2024 06:00), Max: 98.2 (2024 14:15)  HR: 84 (2024 06:00) (73 - 94)  BP: 106/72 (2024 06:00) (95/51 - 113/65)  BP(mean): --  RR: 18 (2024 06:00) (17 - 18)  SpO2: 99% (2024 06:00) (98% - 99%)        MEDICATIONS  (STANDING):  acetaminophen     Tablet .. 975 milliGRAM(s) Oral <User Schedule>  ascorbic acid 500 milliGRAM(s) Oral daily  diphtheria/tetanus/pertussis (acellular) Vaccine (Adacel) 0.5 milliLiter(s) IntraMuscular once  ferrous    sulfate 325 milliGRAM(s) Oral three times a day  heparin   Injectable 5000 Unit(s) SubCutaneous every 12 hours  ibuprofen  Tablet. 600 milliGRAM(s) Oral every 6 hours  lactated ringers. 1000 milliLiter(s) (125 mL/Hr) IV Continuous <Continuous>  levothyroxine 100 MICROGram(s) Oral <User Schedule>  levothyroxine 50 MICROGram(s) Oral <User Schedule>  prenatal multivitamin 1 Tablet(s) Oral daily  senna 2 Tablet(s) Oral at bedtime      MEDICATIONS  (PRN):  diphenhydrAMINE 25 milliGRAM(s) Oral every 6 hours PRN Pruritus  lanolin Ointment 1 Application(s) Topical every 6 hours PRN Sore Nipples  magnesium hydroxide Suspension 30 milliLiter(s) Oral two times a day PRN Constipation  oxyCODONE    IR 5 milliGRAM(s) Oral once PRN Moderate to Severe Pain (4-10)  oxyCODONE    IR 5 milliGRAM(s) Oral every 3 hours PRN Moderate to Severe Pain (4-10)  simethicone 80 milliGRAM(s) Chew every 4 hours PRN Gas      Labs:  Blood type: A Positive  Rubella IgG: RPR: Negative                          8.8<L>   8.54 >-----------< 198    (  @ 05:45 )             25.4<L>                        9.0<L>   9.18 >-----------< 197    (  @ 02:00 )             27.6<L>                        11.2<L>   7.09 >-----------< 234    (  @ 00:44 )             31.9<L>    24 @ 00:59      137  |  103  |  5<L>  ----------------------------<  151<H>  4.1   |  20<L>  |  0.46<L>        Ca    8.6      2024 00:59    TPro  5.1<L>  /  Alb  2.7<L>  /  TBili  0.3  /  DBili  x   /  AST  26  /  ALT  8   /  AlkPhos  88  24 @ 00:59          PE:  General: NAD  Heart: extremities well-perfused  Lungs: breathing normally  Abdomen: Soft, appropriately tender, incision c/d/i w Aquacel, fundus firm  Extremities: No pain to palpation in lower legs, SCDs placed    A/P: 27yo  POD#2 s/p LTCS.  Patient is stable and doing well post-operatively.    - Continue regular diet.  - Increase ambulation.  - Continue motrin, tylenol, oxycodone PRN for pain control.    Jama Reese PGY1 OB Progress Note: rLTCS, POD#2    S: 29yo  POD#2 s/p rLTCS. Pain is well controlled. She is tolerating a regular diet and passing flatus. She is voiding spontaneously, and ambulating without difficulty. Endorses light vaginal bleeding, soaking <1 pad/hr. Denies CP/SOB. Denies lightheadedness/dizziness. Denies N/V.    Pt had pain episode overnight, with sensation of being unable to urinate and endorsing 10/10, burning pain. Pt encouraged to PO hydrate. Episode resolved following pt urinating.    O:  Vitals:  Vital Signs Last 24 Hrs  T(C): 36.3 (2024 06:00), Max: 36.8 (2024 14:15)  T(F): 97.4 (2024 06:00), Max: 98.2 (2024 14:15)  HR: 84 (2024 06:00) (73 - 94)  BP: 106/72 (2024 06:00) (95/51 - 113/65)  BP(mean): --  RR: 18 (2024 06:00) (17 - 18)  SpO2: 99% (2024 06:00) (98% - 99%)        MEDICATIONS  (STANDING):  acetaminophen     Tablet .. 975 milliGRAM(s) Oral <User Schedule>  ascorbic acid 500 milliGRAM(s) Oral daily  diphtheria/tetanus/pertussis (acellular) Vaccine (Adacel) 0.5 milliLiter(s) IntraMuscular once  ferrous    sulfate 325 milliGRAM(s) Oral three times a day  heparin   Injectable 5000 Unit(s) SubCutaneous every 12 hours  ibuprofen  Tablet. 600 milliGRAM(s) Oral every 6 hours  lactated ringers. 1000 milliLiter(s) (125 mL/Hr) IV Continuous <Continuous>  levothyroxine 100 MICROGram(s) Oral <User Schedule>  levothyroxine 50 MICROGram(s) Oral <User Schedule>  prenatal multivitamin 1 Tablet(s) Oral daily  senna 2 Tablet(s) Oral at bedtime      MEDICATIONS  (PRN):  diphenhydrAMINE 25 milliGRAM(s) Oral every 6 hours PRN Pruritus  lanolin Ointment 1 Application(s) Topical every 6 hours PRN Sore Nipples  magnesium hydroxide Suspension 30 milliLiter(s) Oral two times a day PRN Constipation  oxyCODONE    IR 5 milliGRAM(s) Oral once PRN Moderate to Severe Pain (4-10)  oxyCODONE    IR 5 milliGRAM(s) Oral every 3 hours PRN Moderate to Severe Pain (4-10)  simethicone 80 milliGRAM(s) Chew every 4 hours PRN Gas      Labs:  Blood type: A Positive  Rubella IgG: RPR: Negative                          8.8<L>   8.54 >-----------< 198    (  @ 05:45 )             25.4<L>                        9.0<L>   9.18 >-----------< 197    (  @ 02:00 )             27.6<L>                        11.2<L>   7.09 >-----------< 234    (  @ 00:44 )             31.9<L>    24 @ 00:59      137  |  103  |  5<L>  ----------------------------<  151<H>  4.1   |  20<L>  |  0.46<L>        Ca    8.6      2024 00:59    TPro  5.1<L>  /  Alb  2.7<L>  /  TBili  0.3  /  DBili  x   /  AST  26  /  ALT  8   /  AlkPhos  88  24 @ 00:59          PE:  General: NAD  Heart: extremities well-perfused  Lungs: breathing normally  Abdomen: Soft, appropriately tender, incision c/d/i w Aquacel, fundus firm  Extremities: No pain to palpation in lower legs, SCDs placed    A/P: 29yo  POD#2 s/p LTCS.  Patient is stable and doing well post-operatively.    - Continue regular diet.  - Increase ambulation.  - Continue motrin, tylenol, oxycodone PRN for pain control.    Jama Reese PGY1 OB Progress Note: rLTCS, POD#2    S: 27yo  POD#2 s/p rLTCS. Pain is well controlled. She is tolerating a regular diet and passing flatus. She is voiding spontaneously, and ambulating without difficulty. Endorses light vaginal bleeding, soaking <1 pad/hr. Denies CP/SOB. Denies lightheadedness/dizziness. Denies N/V.    Pt had pain episode overnight, with sensation of being unable to urinate and endorsing 10/10, burning pain. Pt encouraged to PO hydrate. Episode resolved following pt urinating.    O:  Vitals:  Vital Signs Last 24 Hrs  T(C): 36.3 (2024 06:00), Max: 36.8 (2024 14:15)  T(F): 97.4 (2024 06:00), Max: 98.2 (2024 14:15)  HR: 84 (2024 06:00) (73 - 94)  BP: 106/72 (2024 06:00) (95/51 - 113/65)  BP(mean): --  RR: 18 (2024 06:00) (17 - 18)  SpO2: 99% (2024 06:00) (98% - 99%)        MEDICATIONS  (STANDING):  acetaminophen     Tablet .. 975 milliGRAM(s) Oral <User Schedule>  ascorbic acid 500 milliGRAM(s) Oral daily  diphtheria/tetanus/pertussis (acellular) Vaccine (Adacel) 0.5 milliLiter(s) IntraMuscular once  ferrous    sulfate 325 milliGRAM(s) Oral three times a day  heparin   Injectable 5000 Unit(s) SubCutaneous every 12 hours  ibuprofen  Tablet. 600 milliGRAM(s) Oral every 6 hours  lactated ringers. 1000 milliLiter(s) (125 mL/Hr) IV Continuous <Continuous>  levothyroxine 100 MICROGram(s) Oral <User Schedule>  levothyroxine 50 MICROGram(s) Oral <User Schedule>  prenatal multivitamin 1 Tablet(s) Oral daily  senna 2 Tablet(s) Oral at bedtime      MEDICATIONS  (PRN):  diphenhydrAMINE 25 milliGRAM(s) Oral every 6 hours PRN Pruritus  lanolin Ointment 1 Application(s) Topical every 6 hours PRN Sore Nipples  magnesium hydroxide Suspension 30 milliLiter(s) Oral two times a day PRN Constipation  oxyCODONE    IR 5 milliGRAM(s) Oral once PRN Moderate to Severe Pain (4-10)  oxyCODONE    IR 5 milliGRAM(s) Oral every 3 hours PRN Moderate to Severe Pain (4-10)  simethicone 80 milliGRAM(s) Chew every 4 hours PRN Gas      Labs:  Blood type: A Positive  Rubella IgG: RPR: Negative                          8.8<L>   8.54 >-----------< 198    (  @ 05:45 )             25.4<L>                        9.0<L>   9.18 >-----------< 197    (  @ 02:00 )             27.6<L>                        11.2<L>   7.09 >-----------< 234    (  @ 00:44 )             31.9<L>    24 @ 00:59      137  |  103  |  5<L>  ----------------------------<  151<H>  4.1   |  20<L>  |  0.46<L>        Ca    8.6      2024 00:59    TPro  5.1<L>  /  Alb  2.7<L>  /  TBili  0.3  /  DBili  x   /  AST  26  /  ALT  8   /  AlkPhos  88  24 @ 00:59          PE:  General: NAD  Heart: extremities well-perfused  Lungs: breathing normally  Abdomen: Soft, appropriately tender, incision c/d/i w Aquacel, fundus firm  Extremities: No pain to palpation in lower legs, SCDs placed

## 2024-07-18 NOTE — PROGRESS NOTE ADULT - ASSESSMENT
A/P: 29yo  POD#2 s/p LTCS.  Patient is stable and doing well post-operatively.    - Continue regular diet.  - Increase ambulation.  - Continue motrin, tylenol, oxycodone PRN for pain control.    Jama Reese PGY1   A/P: 29yo  POD#2 s/p rLTCS.  Patient is stable and doing well post-operatively.      #LTCS+bladder dissection  - Continue regular diet.  - Increase ambulation.  - Continue motrin, tylenol, oxycodone PRN for pain control.  - s/p Wilkinson, voiding spontaneously  - Appreciate Urology recs, cleared for d/c     Jama Reese PGY1   A/P: 27yo  POD#2 s/p rLTCS.  Patient is stable and doing well post-operatively.      #LTCS+bladder dissection  - Continue regular diet.  - Increase ambulation.  - Continue motrin, tylenol, oxycodone PRN for pain control.  - s/p Wilkinson, voiding spontaneously  - Appreciate Urology recs, cleared for d/c   - H/H: 31.9->25.4, appropriate drop  - QBL: 952    Jama Reese PGY1  A Sacks, PGY1

## 2024-07-19 VITALS
DIASTOLIC BLOOD PRESSURE: 78 MMHG | SYSTOLIC BLOOD PRESSURE: 118 MMHG | TEMPERATURE: 98 F | HEART RATE: 72 BPM | RESPIRATION RATE: 18 BRPM | OXYGEN SATURATION: 100 %

## 2024-07-19 RX ORDER — FERROUS SULFATE 325(65) MG
1 TABLET ORAL
Qty: 0 | Refills: 0 | DISCHARGE
Start: 2024-07-19

## 2024-07-19 RX ADMIN — Medication 80 MILLIGRAM(S): at 04:12

## 2024-07-19 RX ADMIN — Medication 975 MILLIGRAM(S): at 02:57

## 2024-07-19 RX ADMIN — Medication 975 MILLIGRAM(S): at 03:27

## 2024-07-19 RX ADMIN — Medication 600 MILLIGRAM(S): at 00:12

## 2024-07-19 RX ADMIN — Medication 80 MILLIGRAM(S): at 00:11

## 2024-07-19 RX ADMIN — Medication 100 MICROGRAM(S): at 06:58

## 2024-07-19 RX ADMIN — HEPARIN SODIUM 5000 UNIT(S): 50 INJECTION, SOLUTION INTRAVENOUS at 02:58

## 2024-07-19 RX ADMIN — Medication 600 MILLIGRAM(S): at 00:42

## 2024-07-19 RX ADMIN — Medication 600 MILLIGRAM(S): at 06:15

## 2024-07-19 RX ADMIN — Medication 325 MILLIGRAM(S): at 01:26

## 2024-07-19 RX ADMIN — Medication 600 MILLIGRAM(S): at 05:45

## 2024-07-19 RX ADMIN — Medication 2 TABLET(S): at 00:12

## 2024-07-19 NOTE — PROGRESS NOTE ADULT - ATTENDING COMMENTS
As above  Pt has voided twice since Wilkinson catheter was removed  urine is clear yellow  no dysuria  Pt counseled to expect some mild LUTS which should all resolve in due course
Pt feels well with passing increased flatus.  No nausea or vomiting.  Voiding without a problem.  Pt stable POD#3 s/p Rpt C/S with bladder adhesions  Cleared for D/C home today
Pt seen and evaluated at bedside  Agree with above  POD 1 s/p rLTCS with bladder dissection by urology  UOP adequate  Pt feels well, pain controlled  Cleared for osborn removal by urology   Continue postpartum care    tana QUINTERO
ATT:  Pt seen and examined by me today. I agree with findings, assessment and plan documented in resident note.  consider discharge home tomorrow. Kandy Blackwell MD

## 2024-07-19 NOTE — PROGRESS NOTE ADULT - ASSESSMENT
A/P: 27yo POD#3 s/p LTCS.  Patient is stable and is doing well post-operatively.  - Continue regular diet.  - Increase ambulation.  - Continue motrin, tylenol, oxycodone PRN for pain control.  - s/p Wilkinson, voiding spontaneously  - Appreciate Urology recs, cleared for d/c   - H/H: 31.9->25.4, appropriate drop  - QBL: 952  - Discharge planning    Irish Quarles, PGY1

## 2024-07-19 NOTE — PROGRESS NOTE ADULT - SUBJECTIVE AND OBJECTIVE BOX
OB Postpartum Note:  Delivery, POD#3    S: 27yo POD#3 s/p rLTCS. The patient feels well.  Pain is well controlled. She is tolerating a regular diet and passing minimal flatus. No bowel movement yesterday. Reports that she feels like she is retaining gas. She is voiding spontaneously without issue. She is ambulating without difficulty. Denies CP/SOB. Denies lightheadedness/dizziness. Denies N/V/HA/vision changes. Reports minimal vaginal bleeding.      O:  Vitals:  Vital Signs Last 24 Hrs  T(C): 36.9 (2024 05:59), Max: 36.9 (2024 14:06)  T(F): 98.4 (2024 05:59), Max: 98.4 (2024 14:06)  HR: 67 (2024 05:59) (67 - 99)  BP: 122/70 (2024 05:59) (115/66 - 122/70)  BP(mean): --  RR: 18 (2024 05:59) (18 - 18)  SpO2: 100% (2024 05:59) (97% - 100%)        MEDICATIONS  (STANDING):  acetaminophen     Tablet .. 975 milliGRAM(s) Oral <User Schedule>  ascorbic acid 500 milliGRAM(s) Oral daily  diphtheria/tetanus/pertussis (acellular) Vaccine (Adacel) 0.5 milliLiter(s) IntraMuscular once  ferrous    sulfate 325 milliGRAM(s) Oral three times a day  heparin   Injectable 5000 Unit(s) SubCutaneous every 12 hours  ibuprofen  Tablet. 600 milliGRAM(s) Oral every 6 hours  lactated ringers. 1000 milliLiter(s) (125 mL/Hr) IV Continuous <Continuous>  levothyroxine 100 MICROGram(s) Oral <User Schedule>  levothyroxine 50 MICROGram(s) Oral <User Schedule>  prenatal multivitamin 1 Tablet(s) Oral daily  senna 2 Tablet(s) Oral at bedtime    MEDICATIONS  (PRN):  diphenhydrAMINE 25 milliGRAM(s) Oral every 6 hours PRN Pruritus  lanolin Ointment 1 Application(s) Topical every 6 hours PRN Sore Nipples  magnesium hydroxide Suspension 30 milliLiter(s) Oral two times a day PRN Constipation  oxyCODONE    IR 5 milliGRAM(s) Oral once PRN Moderate to Severe Pain (4-10)  oxyCODONE    IR 5 milliGRAM(s) Oral every 3 hours PRN Moderate to Severe Pain (4-10)  simethicone 80 milliGRAM(s) Chew every 4 hours PRN Gas      LABS:  Blood type: A Positive  Rubella IgG: RPR: Negative                          8.8<L>   8.54 >-----------< 198    (  @ 05:45 )             25.4<L>                        9.0<L>   9.18 >-----------< 197    (  @ 02:00 )             27.6<L>    24 @ 00:59      137  |  103  |  5<L>  ----------------------------<  151<H>  4.1   |  20<L>  |  0.46<L>        Ca    8.6      2024 00:59    TPro  5.1<L>  /  Alb  2.7<L>  /  TBili  0.3  /  DBili  x   /  AST  26  /  ALT  8   /  AlkPhos  88  24 @ 00:59          Physical exam:  Gen: NAD  Pulm: breathing on room air   Abdomen: Soft, nontender, moderate distension   Incision: Clean, dry, and intact   Pelvic: Normal lochia noted  Ext: No calf tenderness

## 2024-09-05 PROBLEM — J45.20 MILD INTERMITTENT ASTHMA, UNSPECIFIED WHETHER COMPLICATED: Status: ACTIVE | Noted: 2024-09-05

## 2024-12-19 ENCOUNTER — RX RENEWAL (OUTPATIENT)
Age: 29
End: 2024-12-19

## 2024-12-19 ENCOUNTER — TRANSCRIPTION ENCOUNTER (OUTPATIENT)
Age: 29
End: 2024-12-19

## 2025-01-06 ENCOUNTER — APPOINTMENT (OUTPATIENT)
Dept: OBGYN | Facility: CLINIC | Age: 30
End: 2025-01-06
Payer: COMMERCIAL

## 2025-01-06 VITALS
BODY MASS INDEX: 34.6 KG/M2 | SYSTOLIC BLOOD PRESSURE: 118 MMHG | DIASTOLIC BLOOD PRESSURE: 77 MMHG | HEART RATE: 78 BPM | HEIGHT: 62 IN | WEIGHT: 188 LBS

## 2025-01-06 DIAGNOSIS — Z01.419 ENCOUNTER FOR GYNECOLOGICAL EXAMINATION (GENERAL) (ROUTINE) W/OUT ABNORMAL FINDINGS: ICD-10-CM

## 2025-01-06 DIAGNOSIS — Z78.9 OTHER SPECIFIED HEALTH STATUS: ICD-10-CM

## 2025-01-06 PROCEDURE — 99395 PREV VISIT EST AGE 18-39: CPT

## 2025-01-06 PROCEDURE — 99459 PELVIC EXAMINATION: CPT

## 2025-01-06 RX ORDER — SEMAGLUTIDE 1.34 MG/ML
2 INJECTION, SOLUTION SUBCUTANEOUS
Refills: 0 | Status: ACTIVE | COMMUNITY

## 2025-01-06 RX ORDER — NORGESTIMATE AND ETHINYL ESTRADIOL 0.25-0.035
0.25-35 KIT ORAL DAILY
Qty: 3 | Refills: 0 | Status: ACTIVE | COMMUNITY
Start: 2025-01-06 | End: 1900-01-01

## 2025-01-08 LAB — HPV HIGH+LOW RISK DNA PNL CVX: NOT DETECTED

## 2025-01-09 LAB — CYTOLOGY CVX/VAG DOC THIN PREP: NORMAL

## 2025-03-28 ENCOUNTER — RX RENEWAL (OUTPATIENT)
Age: 30
End: 2025-03-28

## 2025-03-28 RX ORDER — NORGESTIMATE AND ETHINYL ESTRADIOL 0.25-0.035
0.25-35 KIT ORAL
Qty: 84 | Refills: 3 | Status: ACTIVE | COMMUNITY
Start: 2025-03-28 | End: 1900-01-01

## 2025-04-28 ENCOUNTER — TRANSCRIPTION ENCOUNTER (OUTPATIENT)
Age: 30
End: 2025-04-28

## 2025-08-18 ENCOUNTER — NON-APPOINTMENT (OUTPATIENT)
Age: 30
End: 2025-08-18

## 2025-08-22 ENCOUNTER — NON-APPOINTMENT (OUTPATIENT)
Age: 30
End: 2025-08-22

## 2025-09-09 ENCOUNTER — TRANSCRIPTION ENCOUNTER (OUTPATIENT)
Age: 30
End: 2025-09-09

## 2025-09-15 ENCOUNTER — TRANSCRIPTION ENCOUNTER (OUTPATIENT)
Age: 30
End: 2025-09-15

## 2025-09-15 DIAGNOSIS — E03.9 HYPOTHYROIDISM, UNSPECIFIED: ICD-10-CM
